# Patient Record
Sex: MALE | Race: BLACK OR AFRICAN AMERICAN | NOT HISPANIC OR LATINO | URBAN - METROPOLITAN AREA
[De-identification: names, ages, dates, MRNs, and addresses within clinical notes are randomized per-mention and may not be internally consistent; named-entity substitution may affect disease eponyms.]

---

## 2024-08-02 ENCOUNTER — OFFICE VISIT (OUTPATIENT)
Dept: PRIMARY CARE CLINIC | Facility: CLINIC | Age: 29
End: 2024-08-02
Payer: COMMERCIAL

## 2024-08-02 VITALS
SYSTOLIC BLOOD PRESSURE: 130 MMHG | DIASTOLIC BLOOD PRESSURE: 78 MMHG | WEIGHT: 198 LBS | HEIGHT: 67 IN | OXYGEN SATURATION: 98 % | RESPIRATION RATE: 17 BRPM | BODY MASS INDEX: 31.08 KG/M2 | HEART RATE: 89 BPM

## 2024-08-02 DIAGNOSIS — Z13.6 ENCOUNTER FOR LIPID SCREENING FOR CARDIOVASCULAR DISEASE: ICD-10-CM

## 2024-08-02 DIAGNOSIS — Z11.4 SCREENING FOR HIV (HUMAN IMMUNODEFICIENCY VIRUS): ICD-10-CM

## 2024-08-02 DIAGNOSIS — N50.89 MASS OF LEFT TESTICLE: ICD-10-CM

## 2024-08-02 DIAGNOSIS — Z13.220 ENCOUNTER FOR LIPID SCREENING FOR CARDIOVASCULAR DISEASE: ICD-10-CM

## 2024-08-02 DIAGNOSIS — N50.812 PAIN IN LEFT TESTICLE: ICD-10-CM

## 2024-08-02 DIAGNOSIS — Z00.00 ENCOUNTER FOR MEDICAL EXAMINATION TO ESTABLISH CARE: Primary | ICD-10-CM

## 2024-08-02 DIAGNOSIS — Z23 NEED FOR VACCINATION: ICD-10-CM

## 2024-08-02 PROCEDURE — 99999 PR PBB SHADOW E&M-NEW PATIENT-LVL IV: CPT | Mod: PBBFAC,,, | Performed by: NURSE PRACTITIONER

## 2024-08-02 NOTE — PROGRESS NOTES
[facility] Note    Chief Complaint      Chief Complaint   Patient presents with    [facility]    Annual Exam     History of Present Illness      Dannie Ochoa is a 29 y.o. male patient with chronic conditions of none who presents today for [facility]    Labs- ordered  Eye exams      Vaccines:  COVID-2016  Tdap-2016    Complaints of left testicle swelling, pain, feels a knot to the inferior side of the testicle.  Patient reports after drinking alcohol the shaft of his penis burns.  Also reports his sperm looks very liquidy.  He reports this has happened on and off for a few years.  No complaints of hematuria, change in urine, cloudy urine, burning with urination, rash or radiating pain  Was treated in 2020 for and infected cyst to testicle      Health Maintenance   Topic Date Due    Lipid Panel  Never done    TETANUS VACCINE  04/17/2026    Hepatitis C Screening  Completed       History reviewed. No pertinent past medical history.    Past Surgical History:   Procedure Laterality Date    HAND SURGERY         family history is not on file.     Social History     Tobacco Use    Smoking status: Every Day     Types: Cigarettes     Passive exposure: Never    Smokeless tobacco: Never   Substance Use Topics    Alcohol use: Yes    Drug use: Yes     Types: Marijuana       Review of Systems   Constitutional:  Negative for chills and fever.   HENT:  Negative for congestion, sinus pain and sore throat.    Eyes:  Negative for blurred vision.   Respiratory:  Negative for cough, shortness of breath and wheezing.    Cardiovascular:  Negative for chest pain, palpitations and leg swelling.   Gastrointestinal:  Negative for abdominal pain, constipation, diarrhea, nausea and vomiting.   Genitourinary:  Negative for dysuria, flank pain, frequency, hematuria and urgency.        Left testicular pain   Musculoskeletal:  Negative for myalgias.   Skin:  Negative for rash.   Neurological:  Negative for dizziness,  "weakness and headaches.   Psychiatric/Behavioral:  Negative for depression. The patient is not nervous/anxious.         No outpatient encounter medications on file as of 8/2/2024.     Facility-Administered Encounter Medications as of 8/2/2024   Medication Dose Route Frequency Provider Last Rate Last Admin    pneumoc 20-spike conj-dip cr(PF) (PREVNAR-20 (PF)) injection Syrg 0.5 mL  0.5 mL Intramuscular 1 time in Clinic/HOD            Review of patient's allergies indicates:  No Known Allergies    Physical Exam      Vital Signs  Pulse: 89  Resp: 17  SpO2: 98 %  BP: 130/78  BP Location: Right arm  Patient Position: Sitting  Height and Weight  Height: 5' 7" (170.2 cm)  Weight: 89.8 kg (197 lb 15.6 oz)  BSA (Calculated - sq m): 2.06 sq meters  BMI (Calculated): 31  Weight in (lb) to have BMI = 25: 159.3    Physical Exam  Vitals and nursing note reviewed.   Constitutional:       General: He is not in acute distress.     Appearance: Normal appearance. He is well-developed. He is not ill-appearing.   HENT:      Head: Normocephalic and atraumatic.      Right Ear: External ear normal.      Left Ear: External ear normal.   Eyes:      Conjunctiva/sclera: Conjunctivae normal.      Pupils: Pupils are equal, round, and reactive to light.   Neck:      Thyroid: No thyromegaly.      Vascular: No JVD.      Trachea: No tracheal deviation.   Cardiovascular:      Rate and Rhythm: Normal rate and regular rhythm.      Heart sounds: Normal heart sounds.   Pulmonary:      Effort: Pulmonary effort is normal. No respiratory distress.      Breath sounds: Normal breath sounds.   Abdominal:      General: Bowel sounds are normal. There is no distension.      Palpations: Abdomen is soft.      Tenderness: There is no abdominal tenderness.   Genitourinary:     Penis: Normal.        Musculoskeletal:         General: Normal range of motion.      Cervical back: Normal range of motion and neck supple.   Lymphadenopathy:      Cervical: No cervical " "adenopathy.   Skin:     General: Skin is warm and dry.      Coloration: Skin is not pale.      Findings: No erythema or rash.   Neurological:      General: No focal deficit present.      Mental Status: He is alert and oriented to person, place, and time.   Psychiatric:         Mood and Affect: Mood normal.         Behavior: Behavior normal.         Thought Content: Thought content normal.         Judgment: Judgment normal.          Laboratory:  CBC:  No results found for: "WBC", "RBC", "HGB", "HCT", "PLT", "MCV", "MCH", "MCHC"  CMP:  No results found for: "GLU", "CALCIUM", "ALBUMIN", "PROT", "NA", "K", "CO2", "CL", "BUN", "ALKPHOS", "ALT", "AST", "BILITOT"  URINALYSIS:  No results found for: "COLORU", "CLARITYU", "SPECGRAV", "PHUR", "PROTEINUA", "GLUCOSEU", "BILIRUBINCON", "BLOODU", "WBCU", "RBCU", "BACTERIA", "MUCUS", "NITRITE", "LEUKOCYTESUR", "UROBILINOGEN", "HYALINECASTS"   LIPIDS:  No results found for: "TSH", "HDL", "CHOL", "TRIG", "LDLCALC", "CHOLHDL", "NONHDLCHOL", "TOTALCHOLEST"  TSH:  No results found for: "TSH"  A1C:  No results found for: "HGBA1C"      Assessment/Plan     Dannie Ochoa is a 29 y.o.male with:    Encounter for medical examination to establish care  -     CBC Auto Differential; Future; Expected date: 08/02/2024  -     Comprehensive Metabolic Panel; Future; Expected date: 08/02/2024  -     Lipid Panel; Future; Expected date: 08/02/2024  -     HIV 1/2 Ag/Ab (4th Gen); Future; Expected date: 08/02/2024  -     US Scrotum And Testicles; Future; Expected date: 08/02/2024  -     Cancel: Urinalysis  -     CULTURE, URINE; Future; Expected date: 08/02/2024  -     Urinalysis; Future; Expected date: 08/02/2024    Encounter for lipid screening for cardiovascular disease  -     CBC Auto Differential; Future; Expected date: 08/02/2024  -     Comprehensive Metabolic Panel; Future; Expected date: 08/02/2024  -     Lipid Panel; Future; Expected date: 08/02/2024  -     HIV 1/2 Ag/Ab (4th Gen); Future; " Expected date: 08/02/2024  -     US Scrotum And Testicles; Future; Expected date: 08/02/2024  -     Cancel: Urinalysis  -     CULTURE, URINE; Future; Expected date: 08/02/2024  -     Urinalysis; Future; Expected date: 08/02/2024    Screening for HIV (human immunodeficiency virus)  -     CBC Auto Differential; Future; Expected date: 08/02/2024  -     Comprehensive Metabolic Panel; Future; Expected date: 08/02/2024  -     Lipid Panel; Future; Expected date: 08/02/2024  -     HIV 1/2 Ag/Ab (4th Gen); Future; Expected date: 08/02/2024  -     US Scrotum And Testicles; Future; Expected date: 08/02/2024  -     Cancel: Urinalysis  -     CULTURE, URINE; Future; Expected date: 08/02/2024  -     Urinalysis; Future; Expected date: 08/02/2024    Pain in left testicle  -     CBC Auto Differential; Future; Expected date: 08/02/2024  -     Comprehensive Metabolic Panel; Future; Expected date: 08/02/2024  -     Lipid Panel; Future; Expected date: 08/02/2024  -     HIV 1/2 Ag/Ab (4th Gen); Future; Expected date: 08/02/2024  -     US Scrotum And Testicles; Future; Expected date: 08/02/2024  -     Cancel: Urinalysis  -     CULTURE, URINE; Future; Expected date: 08/02/2024  -     Urinalysis; Future; Expected date: 08/02/2024    Mass of left testicle  -     CBC Auto Differential; Future; Expected date: 08/02/2024  -     Comprehensive Metabolic Panel; Future; Expected date: 08/02/2024  -     Lipid Panel; Future; Expected date: 08/02/2024  -     HIV 1/2 Ag/Ab (4th Gen); Future; Expected date: 08/02/2024  -     US Scrotum And Testicles; Future; Expected date: 08/02/2024  -     Cancel: Urinalysis  -     CULTURE, URINE; Future; Expected date: 08/02/2024  -     Urinalysis; Future; Expected date: 08/02/2024    Need for vaccination  -     pneumoc 20-spike conj-dip cr(PF) (PREVNAR-20 (PF)) injection Syrg 0.5 mL         Health Maintenance Due   Topic Date Due    Lipid Panel  Never done    HIV Screening  Never done    Pneumococcal Vaccines (Age 0-64)  (2 of 2 - PPSV23 or PCV20) 06/23/2010    COVID-19 Vaccine (3 - 2023-24 season) 09/01/2023          I spent 39 minutes on the day of this encounter for preparing for, evaluating, treating, and managing this patient.        -Continue current medications and maintain follow up with specialists.  Return to clinic in 1 year sooner for any concerns No follow-ups on file.      LINH GoodmanC  [facility]

## 2024-08-05 ENCOUNTER — LAB VISIT (OUTPATIENT)
Dept: LAB | Facility: HOSPITAL | Age: 29
End: 2024-08-05
Attending: NURSE PRACTITIONER
Payer: COMMERCIAL

## 2024-08-05 DIAGNOSIS — Z11.4 SCREENING FOR HIV (HUMAN IMMUNODEFICIENCY VIRUS): ICD-10-CM

## 2024-08-05 DIAGNOSIS — N50.89 MASS OF LEFT TESTICLE: ICD-10-CM

## 2024-08-05 DIAGNOSIS — Z13.6 ENCOUNTER FOR LIPID SCREENING FOR CARDIOVASCULAR DISEASE: ICD-10-CM

## 2024-08-05 DIAGNOSIS — N50.812 PAIN IN LEFT TESTICLE: ICD-10-CM

## 2024-08-05 DIAGNOSIS — Z00.00 ENCOUNTER FOR MEDICAL EXAMINATION TO ESTABLISH CARE: ICD-10-CM

## 2024-08-05 DIAGNOSIS — Z13.220 ENCOUNTER FOR LIPID SCREENING FOR CARDIOVASCULAR DISEASE: ICD-10-CM

## 2024-08-05 LAB
BILIRUB UR QL STRIP: NEGATIVE
CLARITY UR REFRACT.AUTO: CLEAR
COLOR UR AUTO: YELLOW
GLUCOSE UR QL STRIP: NEGATIVE
HGB UR QL STRIP: NEGATIVE
KETONES UR QL STRIP: NEGATIVE
LEUKOCYTE ESTERASE UR QL STRIP: NEGATIVE
NITRITE UR QL STRIP: NEGATIVE
PH UR STRIP: 6 [PH] (ref 5–8)
PROT UR QL STRIP: NEGATIVE
SP GR UR STRIP: 1.03 (ref 1–1.03)
URN SPEC COLLECT METH UR: NORMAL

## 2024-08-05 PROCEDURE — 81003 URINALYSIS AUTO W/O SCOPE: CPT | Performed by: NURSE PRACTITIONER

## 2024-08-05 PROCEDURE — 87086 URINE CULTURE/COLONY COUNT: CPT | Performed by: NURSE PRACTITIONER

## 2024-08-06 LAB — BACTERIA UR CULT: NO GROWTH

## 2024-08-08 ENCOUNTER — HOSPITAL ENCOUNTER (OUTPATIENT)
Dept: RADIOLOGY | Facility: HOSPITAL | Age: 29
Discharge: HOME | End: 2024-08-08
Attending: NURSE PRACTITIONER
Payer: COMMERCIAL

## 2024-08-08 DIAGNOSIS — N50.89 MASS OF LEFT TESTICLE: ICD-10-CM

## 2024-08-08 DIAGNOSIS — Z13.220 ENCOUNTER FOR LIPID SCREENING FOR CARDIOVASCULAR DISEASE: ICD-10-CM

## 2024-08-08 DIAGNOSIS — Z11.4 SCREENING FOR HIV (HUMAN IMMUNODEFICIENCY VIRUS): ICD-10-CM

## 2024-08-08 DIAGNOSIS — Z00.00 ENCOUNTER FOR MEDICAL EXAMINATION TO ESTABLISH CARE: ICD-10-CM

## 2024-08-08 DIAGNOSIS — N50.812 PAIN IN LEFT TESTICLE: ICD-10-CM

## 2024-08-08 DIAGNOSIS — Z13.6 ENCOUNTER FOR LIPID SCREENING FOR CARDIOVASCULAR DISEASE: ICD-10-CM

## 2024-08-08 PROCEDURE — 76870 US EXAM SCROTUM: CPT | Mod: 26,,, | Performed by: RADIOLOGY

## 2024-08-08 PROCEDURE — 76870 US EXAM SCROTUM: CPT | Mod: TC

## 2024-12-06 ENCOUNTER — PATIENT MESSAGE (OUTPATIENT)
Dept: PRIMARY CARE CLINIC | Facility: CLINIC | Age: 29
End: 2024-12-06
Payer: COMMERCIAL

## 2024-12-16 ENCOUNTER — OFFICE VISIT (OUTPATIENT)
Dept: PRIMARY CARE CLINIC | Facility: CLINIC | Age: 29
End: 2024-12-16
Payer: COMMERCIAL

## 2024-12-16 DIAGNOSIS — E61.1 IRON DEFICIENCY: ICD-10-CM

## 2024-12-16 DIAGNOSIS — R73.9 ELEVATED BLOOD SUGAR: Primary | ICD-10-CM

## 2024-12-16 PROCEDURE — 1160F RVW MEDS BY RX/DR IN RCRD: CPT | Mod: CPTII,95,, | Performed by: NURSE PRACTITIONER

## 2024-12-16 PROCEDURE — 99213 OFFICE O/P EST LOW 20 MIN: CPT | Mod: 95,,, | Performed by: NURSE PRACTITIONER

## 2024-12-16 PROCEDURE — 1159F MED LIST DOCD IN RCRD: CPT | Mod: CPTII,95,, | Performed by: NURSE PRACTITIONER

## 2024-12-16 NOTE — PROGRESS NOTES
The patient location is:  [place]  The chief complaint leading to consultation is:  discuss lab results    Visit type: audiovisual    Face to Face time with patient: 15  20 minutes of total time spent on the encounter, which includes face to face time and non-face to face time preparing to see the patient (eg, review of tests), Obtaining and/or reviewing separately obtained history, Documenting clinical information in the electronic or other health record, Independently interpreting results (not separately reported) and communicating results to the patient/family/caregiver, or Care coordination (not separately reported).         Each patient to whom he or she provides medical services by telemedicine is:  (1) informed of the relationship between the physician and patient and the respective role of any other health care provider with respect to management of the patient; and (2) notified that he or she may decline to receive medical services by telemedicine and may withdraw from such care at any time.    Notes:   Subjective     Patient ID: Dannie Ochoa is a 29 y.o. male.    Chief Complaint: No chief complaint on file.    HPI   This is a 29-year-old male patient of mine who is presenting to discuss his lab results  Will repeat some of the labs    Review of Systems   Constitutional:  Negative for activity change and unexpected weight change.   HENT:  Negative for hearing loss, rhinorrhea and trouble swallowing.    Eyes:  Negative for discharge and visual disturbance.   Respiratory:  Negative for chest tightness and wheezing.    Cardiovascular:  Negative for chest pain and palpitations.   Gastrointestinal:  Negative for blood in stool, constipation, diarrhea and vomiting.   Endocrine: Negative for polydipsia and polyuria.   Genitourinary:  Negative for difficulty urinating, hematuria and urgency.   Musculoskeletal:  Negative for arthralgias, joint swelling and neck pain.   Neurological:  Negative for weakness and headaches.    Psychiatric/Behavioral:  Negative for confusion and dysphoric mood.           Objective     Physical Exam  Constitutional:       General: He is not in acute distress.     Appearance: Normal appearance. He is not ill-appearing.   HENT:      Head: Normocephalic and atraumatic.   Cardiovascular:      Comments:  No signs and symptoms of distress noted  Pulmonary:      Effort: Pulmonary effort is normal. No respiratory distress.   Neurological:      Mental Status: He is alert and oriented to person, place, and time.   Psychiatric:         Mood and Affect: Mood normal.         Behavior: Behavior normal.         Thought Content: Thought content normal.         Judgment: Judgment normal.            Assessment and Plan     1. Elevated blood sugar  -     CBC Auto Differential; Future; Expected date: 12/16/2024  -     Iron and TIBC; Future; Expected date: 12/16/2024  -     Ferritin; Future; Expected date: 12/16/2024  -     Hemoglobin A1C; Future; Expected date: 12/16/2024    2. Iron deficiency  -     CBC Auto Differential; Future; Expected date: 12/16/2024  -     Iron and TIBC; Future; Expected date: 12/16/2024  -     Ferritin; Future; Expected date: 12/16/2024  -     Hemoglobin A1C; Future; Expected date: 12/16/2024         Follow up as needed for any concerns         Follow up if symptoms worsen or fail to improve.